# Patient Record
Sex: FEMALE | Race: ASIAN | NOT HISPANIC OR LATINO
[De-identification: names, ages, dates, MRNs, and addresses within clinical notes are randomized per-mention and may not be internally consistent; named-entity substitution may affect disease eponyms.]

---

## 2023-05-23 PROBLEM — Z00.00 ENCOUNTER FOR PREVENTIVE HEALTH EXAMINATION: Status: ACTIVE | Noted: 2023-05-23

## 2023-05-31 ENCOUNTER — NON-APPOINTMENT (OUTPATIENT)
Age: 63
End: 2023-05-31

## 2023-05-31 ENCOUNTER — APPOINTMENT (OUTPATIENT)
Dept: ORTHOPEDIC SURGERY | Facility: CLINIC | Age: 63
End: 2023-05-31
Payer: MEDICAID

## 2023-05-31 VITALS
SYSTOLIC BLOOD PRESSURE: 140 MMHG | WEIGHT: 120 LBS | DIASTOLIC BLOOD PRESSURE: 80 MMHG | HEIGHT: 60 IN | HEART RATE: 76 BPM | BODY MASS INDEX: 23.56 KG/M2

## 2023-05-31 DIAGNOSIS — M79.642 PAIN IN RIGHT HAND: ICD-10-CM

## 2023-05-31 DIAGNOSIS — M79.641 PAIN IN RIGHT HAND: ICD-10-CM

## 2023-05-31 PROCEDURE — 20550 NJX 1 TENDON SHEATH/LIGAMENT: CPT | Mod: F1

## 2023-05-31 PROCEDURE — 73130 X-RAY EXAM OF HAND: CPT | Mod: RT

## 2023-05-31 PROCEDURE — 99204 OFFICE O/P NEW MOD 45 MIN: CPT | Mod: 25

## 2023-05-31 NOTE — DISCUSSION/SUMMARY
[FreeTextEntry1] : She has findings consistent with bilateral hand pain secondary to flexor tendinitis most notably secondary to a left index finger trigger finger and a left notable right middle and ring finger trigger finger/flexor tendinitis.\par \par I had a discussion with the patient regarding today's visit, the prognosis of this diagnosis, and treatment recommendations and options. With regard to the left index finger, she and her daughter opted to proceed with a cortisone injection at the flexor tendon sheath. As a diabetic, she understands that this will likely elevate her blood sugars in the short-term.\par \par With regard to the right middle and ring fingers, given her symptoms are relatively mild, I recommended observation. Furthermore, as she is a diabetic, I explained to her and her daughter, that I prefer to only give one cortisone injection at a time, due to the effects of the cortisone on blood glucose levels. I did tell them, if she responds well to the injection given at the left index finger, she may return for further treatment at the right middle and ring fingers. She will follow up as needed, according to her symptoms in this regard. \par \par She has agreed to the above plan of management and has expressed full understanding.  All questions were fully answered to their satisfaction. \par \par My cumulative time spent on this visit included: Preparation for the visit, review of the medical records, review of pertinent diagnostic studies, examination and counseling of the patient on the above diagnosis, treatment plan and prognosis, orders of diagnostic tests, medication and/or appropriate procedures and documentation in the medical records of today's visit.

## 2023-05-31 NOTE — PHYSICAL EXAM
[de-identified] : - Constitutional: This is a female in no obvious distress.  She is accompanied by her daughter today. \par - Psych: Patient is alert and oriented to person, place and time.  Patient has a normal mood and affect.\par - Cardiovascular: Normal pulses throughout the upper extremities.  No significant varicosities are noted in the upper extremities. \par - Neuro: Strength and sensation are intact throughout the upper extremities.  Patient has normal coordination.\par - Respiratory:  Patient exhibits no evidence of shortness of breath or difficulty breathing.\par - Skin: No rashes, lesions, or other abnormalities are noted in the upper extremities.\par \par --- \par \par Examination of her left hand demonstrates swelling and tenderness along the A1 pulley of the index finger.  There is limited flexion.  There is no obvious triggering as she cannot flex to the point of triggering.  There is no triggering the other digits.  She is neurovascularly intact distally.\par \par Examination of her right hand demonstrates tenderness along the A1 pulleys of the middle and ring fingers.  There is no obvious swelling.  There is no obvious triggering.  She has some limitation terminal flexion.  She is neurovascularly intact distally. [de-identified] : AP, lateral, and oblique radiographs of the bilateral hands demonstrate mild degenerative changes at the PIP and DIP joints of the digits.

## 2023-05-31 NOTE — END OF VISIT
[FreeTextEntry3] : This note was written by Nohemi Slaughter on 05/31/2023 acting solely as a scribe for Dr. Giuseppe Monroe.\par  \par All medical record entries made by the Scribe were at my, Dr. Giuseppe Monroe, direction and personally dictated by me on 05/31/2023. I have personally reviewed the chart and agree that the record accurately reflects my personal performance of the history, physical exam, assessment and plan.

## 2023-05-31 NOTE — CONSULT LETTER
[Dear  ___] : Dear  [unfilled], [Consult Letter:] : I had the pleasure of evaluating your patient, [unfilled]. [Please see my note below.] : Please see my note below. [Consult Closing:] : Thank you very much for allowing me to participate in the care of this patient.  If you have any questions, please do not hesitate to contact me. [Sincerely,] : Sincerely, [FreeTextEntry3] : Giuseppe Monroe M.D.\par Surgery of the Hand and Upper Extremity\par Orthopaedic Surgery\par Chief, Hand Service, [facility]\par Director, Hand Service, [facility]\par  Of Orthopaedic Surgery, [facility] [employer] at [facility]\par [facility] Email: [email or web address] Office Phone: [phone]

## 2023-05-31 NOTE — PROCEDURE
[FreeTextEntry1] : -  After a discussion of risks and benefits, the patient agreed to proceed with a cortisone injection.  \par -  Side: Left\par -  Finger: Index finger trigger finger\par -  Medications: 0.5 cc of 1% Lidocaine and 1 cc of Celestone Soluspan, 6mg/cc, using sterile technique.\par -  Patient tolerated the procedure well, without complications.\par -  Patient was told that the symptoms may worsen for a day or two, and should then begin to improve. \par -  Instructions: Patient was instructed on activity modification for the next several days.\par -  Follow-up: Within 4 weeks to assess response to the injection.

## 2023-05-31 NOTE — ADDENDUM
[FreeTextEntry1] : I, Nohemi Slaughter, acted solely as a scribe for Dr. Monroe on this date on 05/31/2023.

## 2023-05-31 NOTE — HISTORY OF PRESENT ILLNESS
[Right] : right hand dominant [FreeTextEntry1] : She comes in today for evaluation of bilateral hand pain x 3 months, L>R. She complains of associated swelling palmarly to the hands as well as limitations of motion of the digits. Her symptoms are worse with use of her hands. She has pain over the A1 pulley of the left index finger as well as right middle and ring fingers. She rates her pain as a 7 to 8 out of 10 at this time. \par \par She is a type II diabetic. She takes Janumet. \par \par She is accompanied by her daughter, who aided in translation to English. \par \par She was referred by her PCP, Dr. Gigi Jain.

## 2023-06-03 ENCOUNTER — NON-APPOINTMENT (OUTPATIENT)
Age: 63
End: 2023-06-03

## 2023-06-14 ENCOUNTER — APPOINTMENT (OUTPATIENT)
Dept: ORTHOPEDIC SURGERY | Facility: CLINIC | Age: 63
End: 2023-06-14

## 2023-06-20 ENCOUNTER — NON-APPOINTMENT (OUTPATIENT)
Age: 63
End: 2023-06-20

## 2023-06-28 ENCOUNTER — APPOINTMENT (OUTPATIENT)
Dept: ORTHOPEDIC SURGERY | Facility: CLINIC | Age: 63
End: 2023-06-28
Payer: MEDICAID

## 2023-06-28 PROCEDURE — 99214 OFFICE O/P EST MOD 30 MIN: CPT | Mod: 25

## 2023-06-28 PROCEDURE — 20550 NJX 1 TENDON SHEATH/LIGAMENT: CPT | Mod: RT

## 2023-06-28 NOTE — PROCEDURE
Occupational Therapy  02/21/22    Orders received, chart reviewed and patient evaluated by occupational therapy. Pending progression with skilled acute occupational therapy, recommend:  Therapy up to 5 days/week in SNF setting vs LTC    Recommend with nursing ADLs with assist, modified bed in chair position 3x/day and toileting via bedpan with 2 assist. Thank you for completing as able in order to maintain patient strength, endurance and independence. Full evaluation to follow.      Thank you,   LEVI Peralta, OTR/L [FreeTextEntry1] : -  After a discussion of risks and benefits, the patient agreed to proceed with a cortisone injection.  \par -  Side: Right\par -  Finger: Ring finger trigger finger\par -  Medications: 0.5 cc of 1% Lidocaine and 1 cc of Kenalog, 40mg/cc, using sterile technique.\par -  Patient tolerated the procedure well, without complications.\par -  Patient was told that the symptoms may worsen for a day or two, and should then begin to improve. \par -  Instructions: Patient was instructed on activity modification for the next several days.\par -  Follow-up: Within 4 weeks to assess response to the injection.

## 2023-06-28 NOTE — DISCUSSION/SUMMARY
[FreeTextEntry1] : I had a discussion regarding today's visit, the diagnosis and treatment recommendations and options.  We also discussed changes since the last visit.  With regard to the left index finger, given her improvement post cortisone injection at the flexor tendon sheath, four weeks ago, I recommended observation. She will follow up as needed in this regard.\par \par With regard to the right ring finger, given her worsening symptoms, she agreed to proceed with a cortisone injection at the flexor tendon sheath. As a diabetic, she understands that this will likely elevate her blood sugars in the short-term.\par \par The patient has agreed to the above plan of management and has expressed full understanding.  All questions were fully answered to the patient's satisfaction.\par \par My cumulative time spent on today's visit was greater than 30 minutes and included: Preparation for the visit, review of the medical records, review of pertinent diagnostic studies, examination and counseling of the patient on the above diagnosis, treatment plan and prognosis, orders of diagnostic tests, medications and/or appropriate procedures and documentation in the medical records of today's visit.

## 2023-06-28 NOTE — PHYSICAL EXAM
[de-identified] : - Constitutional: This is a female in no obvious distress.  She is accompanied by her daughter today. \par - Psych: Patient is alert and oriented to person, place and time.  Patient has a normal mood and affect.\par - Cardiovascular: Normal pulses throughout the upper extremities.  No significant varicosities are noted in the upper extremities. \par - Neuro: Strength and sensation are intact throughout the upper extremities.  Patient has normal coordination.\par - Respiratory:  Patient exhibits no evidence of shortness of breath or difficulty breathing.\par - Skin: No rashes, lesions, or other abnormalities are noted in the upper extremities.\par \par --- \par \par Examination of her left hand demonstrates no further swelling or tenderness along the A1 pulley of the index finger.  There is improved flexion.  There is no triggering the other digits.  She is neurovascularly intact distally.\par \par Examination of her right hand demonstrates tenderness along and swelling along the A1 pulley of the ring finger.  There is no triggering, as she cannot flex to the point of triggering.  There is mild tenderness without swelling along the A1 pulley of the middle finger.  There is no obvious triggering of the middle finger.  She has some limitation terminal flexion.  She is neurovascularly intact distally. [de-identified] : AP, lateral, and oblique radiographs of the bilateral hands dated 5/31/2023 demonstrated mild degenerative changes at the PIP and DIP joints of the digits.

## 2023-06-28 NOTE — END OF VISIT
[FreeTextEntry3] : This note was written by Nohemi Slaughter on 06/28/2023 acting solely as a scribe for Dr. Giuseppe Monroe.\par  \par All medical record entries made by the Scribe were at my, Dr. Giuseppe Monroe, direction and personally dictated by me on 06/28/2023. I have personally reviewed the chart and agree that the record accurately reflects my personal performance of the history, physical exam, assessment and plan.

## 2023-06-28 NOTE — ADDENDUM
[FreeTextEntry1] : I, Nohemi Slaughter, acted solely as a scribe for Dr. Monroe on this date on 06/28/2023.

## 2023-06-28 NOTE — HISTORY OF PRESENT ILLNESS
[FreeTextEntry1] : Follow-up regarding bilateral hand pain secondary to flexor tendinitis most notably secondary to a left index finger trigger finger and a left notable right middle and ring finger trigger finger/flexor tendinitis.\par \par See note from when she was in the office 4 weeks ago.  She was given a cortisone injection at the left index finger trigger finger.\par \par She is doing well with regard to the left index finger post cortisone injection 4 weeks ago. She now has complaints of right ring finger pain and triggering. \par \par She is a type II diabetic. She takes Janumet. \par \par She is accompanied by her daughter, who aided in translation to English.

## 2024-06-05 ENCOUNTER — NON-APPOINTMENT (OUTPATIENT)
Age: 64
End: 2024-06-05

## 2024-06-13 ENCOUNTER — APPOINTMENT (OUTPATIENT)
Dept: ORTHOPEDIC SURGERY | Facility: CLINIC | Age: 64
End: 2024-06-13
Payer: MEDICAID

## 2024-06-13 PROBLEM — M65.322 TRIGGER INDEX FINGER OF LEFT HAND: Status: ACTIVE | Noted: 2023-05-31

## 2024-06-13 PROBLEM — M77.8 TENDINITIS OF RIGHT HAND: Status: ACTIVE | Noted: 2023-05-31

## 2024-06-13 PROBLEM — M77.8 TENDINITIS OF LEFT HAND: Status: ACTIVE | Noted: 2023-05-31

## 2024-06-13 PROBLEM — M65.331 TRIGGER MIDDLE FINGER OF RIGHT HAND: Status: ACTIVE | Noted: 2023-05-31

## 2024-06-13 PROBLEM — M65.341 TRIGGER RING FINGER OF RIGHT HAND: Status: ACTIVE | Noted: 2023-05-31

## 2024-06-20 ENCOUNTER — APPOINTMENT (OUTPATIENT)
Dept: ORTHOPEDIC SURGERY | Facility: CLINIC | Age: 64
End: 2024-06-20
Payer: MEDICAID

## 2024-06-20 DIAGNOSIS — M77.8 OTHER ENTHESOPATHIES, NOT ELSEWHERE CLASSIFIED: ICD-10-CM

## 2024-06-20 DIAGNOSIS — M65.341 TRIGGER FINGER, RIGHT RING FINGER: ICD-10-CM

## 2024-06-20 DIAGNOSIS — M65.322 TRIGGER FINGER, LEFT INDEX FINGER: ICD-10-CM

## 2024-06-20 DIAGNOSIS — M65.331 TRIGGER FINGER, RIGHT MIDDLE FINGER: ICD-10-CM

## 2024-06-20 PROCEDURE — 99213 OFFICE O/P EST LOW 20 MIN: CPT | Mod: 25

## 2024-06-20 PROCEDURE — 20550 NJX 1 TENDON SHEATH/LIGAMENT: CPT | Mod: F1,F8

## 2024-06-20 RX ADMIN — BETAMETHASONE ACETATE AND BETAMETHASONE SODIUM PHOSPHATE MG/ML: 3; 3 INJECTION, SUSPENSION INTRA-ARTICULAR; INTRALESIONAL; INTRAMUSCULAR; SOFT TISSUE at 00:00

## 2024-06-20 RX ADMIN — LIDOCAINE HYDROCHLORIDE %: 10 INJECTION, SOLUTION INFILTRATION; PERINEURAL at 00:00

## 2024-06-20 NOTE — HISTORY OF PRESENT ILLNESS
[FreeTextEntry1] : Less than 1 year status post right ring finger trigger cortisone injection #1.  She was given a cortisone injection at her left index finger less than 13 months ago.  She also has had flexor tendinitis of other digits.  She returns today complaining of returned locking, clicking, and pain at her previously injected right ring and left index fingers. She reports that she had symptom relief from the previous injections at those fingers, but her symptoms returned about one month ago.  She is a type II diabetic. She takes Janumet.   She is accompanied by her daughter, who aided in translation to English.

## 2024-06-20 NOTE — DISCUSSION/SUMMARY
[FreeTextEntry1] : I had a discussion regarding today's visit, the diagnosis and treatment recommendations and options.  We also discussed changes since the last visit.  At this time, she agreed to proceed with repeat cortisone injections at her previously injected right ring and left index trigger fingers. She was informed that, as a diabetic, she may notice a spike in her blood sugar following the injections. She opted to proceed. She will follow up according to symptoms.  The patient has agreed to the above plan of management and has expressed full understanding.  All questions were fully answered to the patient's satisfaction.  My cumulative time spent on today's visit was greater than 30 minutes and included: Preparation for the visit, review of the medical records, review of pertinent diagnostic studies, examination and counseling of the patient on the above diagnosis, treatment plan and prognosis, orders of diagnostic tests, medications and/or appropriate procedures and documentation in the medical records of today's visit.

## 2024-06-20 NOTE — PROCEDURE
[FreeTextEntry1] : -  After a discussion of risks and benefits, the patient agreed to proceed with a cortisone injection. -  Side/ Finger: Right ring trigger finger and left index trigger finger -  Medications: 0.5 cc of 1% Lidocaine and 1 cc of Celestone Soluspan, 6mg/cc, using sterile technique. -  Patient tolerated the procedure well, without complications. -  Patient was told that the symptoms may worsen for a day or two, and should then begin to improve. -  Instructions: Patient was instructed on activity modification for the next several days. -  Follow-up: Patient will follow up according to symptoms.

## 2024-06-20 NOTE — END OF VISIT
[FreeTextEntry3] : This note was written by Obi Solano on 6/20/24 acting solely as a scribe for Dr. Giuseppe Monroe.   All medical record entries made by the Scribe were at my, Dr. Giuseppe Monroe, direction and personally dictated by me on 6/20/24. I have personally reviewed the chart and agree that the record accurately reflects my personal performance of the history, physical exam, assessment and plan.

## 2024-06-20 NOTE — PROCEDURE
If your condition worsens or fails to improve we recommend that you receive another evaluation at the ER immediately or contact your PCP to discuss your concerns or return here. You must understand that you've received an urgent care treatment only and that you may be released before all your medical problems are known or treated. You the patient will arrange for followup care as instructed.   Follow up with your primary physician as needed  Tylenol or ibuprofen for pain or fever as needed  If you do take the  Narcotic pain meds be sure to take precautions as it can make you sleepy or drowsy  If you had xrays done we will call you with radiologist report  If you had labs done we will call you with the results  Your wound is not infected at this time. However if it gets painful, turns red or drainage you need to get reevaluated. Either return here or see your PCP or go to the ER  Clean the wound twice a day with betadiene and use antibiotic ointment. Use the one I prescribed if I prescribed one.                                                        Abdominal Pain   If your condition worsens or fails to improve we recommend that you receive another evaluation at the ER immediately or contact your PCP to discuss your concerns or return here. You must understand that you've received an urgent care treatment only and that you may be released before all your medical problems are known or treated. You the patient will arrange for followup care as instructed.   Watch for any increase pain, fever, localized pain to right lower abdomen or continued vomiting or diarrhea.   If you have diarrhea you can use immodium.       Nonspecific Vomiting and Diarrhea (Adult)  Vomiting and diarrhea can have many causes, including:  · Helping your body get rid of harmful substances   · Gastroenteritis caused by viruses, parasites, bacteria, or toxins.  · Allergy to or side effect of a food or medicine  · Severe stress or worry  (anxiety)   · Other illnesses  · Pregnancy  It is often hard to pinpoint an exact cause, even with testing. Vomiting and diarrhea often go away within a day or two without problems. If they continue, though, they can lead to too much loss of fluid (dehydration). This can be serious if not treated.    Home care  Medications  · You may use acetaminophen or NSAID medicines like ibuprofen or naproxen to control fever, unless another medicine was prescribed. If you have chronic liver or kidney disease, talk with your healthcare provider before using these medicines. Also talk with your provider if you've had a stomach ulcer or gastrointestinal bleeding. Don't give aspirin to anyone under 18 years of age who is ill with a fever. Don't use NSAID medicines if you are already taking one for another condition (like arthritis) or are on aspirin (such as for heart disease or after a stroke)  · If medicines for diarrhea or vomiting were prescribed, take these only as directed. Never take these without a healthcare providers approval.  General care  · If symptoms are severe, rest at home for the next 24 hours, or until you are feeling better.  · Washing your hands with soap and water, or using alcohol-based hand  is the best way to stop the spread of infection. Wash your hands after touching anyone who is sick.  · Wash your hands after using the toilet and before meals. Clean the toilet after each use.  · Caffeine, tobacco, and alcohol can make the diarrhea, cramping, and pain worse. Remember, caffeine not only is in coffee, but also is in chocolate, some energy drinks, and teas.  Diet  · Water and clear liquids are important so you don't get dehydrated. Drink a small amount at a time. Don't guzzle down the drinks. That may increase your nausea, make cramping worse, and cause the drinks to come back up.  · Sports drinks may also help. Make sure they are not too sugary, because this can sometimes make things worse. Also,  don't drink beverages that are too acidic, like orange juice and grape juice.  · If you are very dehydrated, sports drinks aren't a good choice. They have too much sugar and not enough electrolytes. In this case, commercially available products called oral rehydration solutions are best.  Food  · Don't force yourself to eat, especially if you have cramps, diarrhea, or vomiting. Eat just a little at a time, and then wait a few minutes before you try to eat more.  · Don't eat fatty, greasy, spicy, or fried foods.  · Don't eat dairy products if you have diarrhea. They can make it worse.  During the first 24 hours (the first full day), follow the diet below:  · Beverages: Sports drinks, soft drinks without caffeine, mineral water, and decaffeinated tea and coffee  · Soups: Clear broth, consommé, and bouillon  · Desserts: Plain gelatin, popsicles, and fruit juice bars  During the next 24 hours (the second day), you may add the following to the above if you are better. If not, continue what you did the first day:  · Hot cereal, plain toast, bread, rolls, crackers  · Plain noodles, rice, mashed potatoes, chicken noodle or rice soup  · Unsweetened canned fruit (avoid pineapple), bananas  · Limit fat intake to less than 15 grams per day by avoiding margarine, butter, oils, mayonnaise, sauces, gravies, fried foods, peanut butter, meat, poultry, and fish.  · Limit fiber. Avoid raw or cooked vegetables, fresh fruits (except bananas) and bran cereals.  · Limit caffeine and chocolate. No spices or seasonings except salt.  During the next 24 hours:  · Gradually resume a normal diet, as you feel better and your symptoms improve.  · If at any time your symptoms start getting worse again, go back to clear liquids until you feel better.  Food preparation  · If you have diarrhea, you should not prepare food for others. When preparing foods, wash your hands before and after.  · Wash your hands or use alcohol-based  after using  cutting boards, countertops, and knives that have been in contact with raw food.  · Keep uncooked meats away from cooked and ready-to-eat foods.  Follow-up care  Follow up with your healthcare advisor, or as advised. Call if you do not get better in the next 2 to 3 days. If a stool (diarrhea) sample was taken, or cultures done, you will be told if they are positive, or if your treatment needs to be changed. You may call as directed for the results.  If X-rays were taken, and a radiologist has not yet looked at them, he or she will do so. You will be told if there is a change in the reading, especially if it affects your treatment.  Call 911  Call 911 if any of these occur:  · Trouble breathing  · Chest pain  · Confusion  · Severe drowsiness or trouble awakening  · Fainting or loss of consciousness  · Rapid heart rate  · Seizure  · Stiff neck  · Severe weakness, dizziness, or lightheadedness  When to seek medical advice  Call your healthcare provider right away if any of these occur:  · Bloody or black vomit or stools.  · Severe, steady abdominal pain or any abdominal pain that is getting worse.  · Severe headache or stiff neck  · An inability to hold down even sips of liquids for more than 12 hours.  · Vomiting that lasts more than 24 hours.  · Diarrhea that lasts more than 24 hours.  · Fever of 100.4°F (38.0°C) or higher that lasts more than 48 hours, or as directed by your health care provider  · Yellowish color to your skin or the whites of your eyes.  · Signs of dehydration, such as dry mouth, little urine (less than every 6 hours), or very dark urine.  Date Last Reviewed: 1/3/2016  © 6485-1788 [employer]. [address] [place] [address], [place] [address]. All rights reserved. This information is not intended as a substitute for professional medical care. Always follow your healthcare professional's instructions.        Athletes Foot    Athletes foot (tinea pedis) is caused by a fungal infection in the  skin. It affects the skin between the toes, causing cracks in the skin called fissures. It can also affect the bottom of the foot where it causes dry white scales and peeling of the skin. This infection is more likely to occur when the foot is in hot, sweaty socks and shoes for long periods of time. You may feel itching and burning between your toes. This infection is treated with skin creams or medicine taken by mouth.  Home care  The following are general care guidelines:  · It is important to keep the feet dry. Use absorbent cotton socks and change them if they become sweaty. Or wear an open-toe shoe or sandal. Wash the feet at least once a day with soap and water.  · Apply the antifungal cream as prescribed. Some antifungal creams are available without a prescription.  · It may take a week before the rash starts to improve. It can take about 3 to 4 weeks to completely clear. Continue the medicine until the rash is all gone.  · Use over-the-counter antifungal powders or sprays on your feet after exposure to high-risk environments, such as public showers, gyms, and locker rooms. This can help prevent future infections. Wearing appropriate shoes in these situations can help.  Prevention  The following tips may help prevent athletes foot:  · Don't share shoes or socks with someone who has athlete's foot.  · Don't walk barefoot in places where a fungal infection can spread quickly such as locker rooms, showers, and swimming pools.  · Change socks regularly.  · Alternate shoes to assist in drying.  Follow-up care  Follow up with your healthcare provider as recommended if the rash does not improve after 10 days of treatment, or if the rash continues to spread.  When to seek medical care  Get medical attention right away if any of the following occur:  · Fever of 100.4°F (38°C) or higher, or as directed  · Increasing redness or swelling of the foot  · Infection comes back soon after treatment  · Pus draining from  cracks in the skin  Date Last Reviewed: 8/1/2016  © 0670-3032 [employer]. [address] [place] [address], [place] [address]. All rights reserved. This information is not intended as a substitute for professional medical care. Always follow your healthcare professional's instructions.         [FreeTextEntry1] : -  After a discussion of risks and benefits, the patient agreed to proceed with a cortisone injection. -  Side/ Finger: Right ring trigger finger and left index trigger finger -  Medications: 0.5 cc of 1% Lidocaine and 1 cc of Celestone Soluspan, 6mg/cc, using sterile technique. -  Patient tolerated the procedure well, without complications. -  Patient was told that the symptoms may worsen for a day or two, and should then begin to improve. -  Instructions: Patient was instructed on activity modification for the next several days. -  Follow-up: Patient will follow up according to symptoms.

## 2024-06-20 NOTE — PHYSICAL EXAM
[de-identified] : - Constitutional: This is a female in no obvious distress.  She is accompanied by her daughter today.  - Psych: Patient is alert and oriented to person, place and time.  Patient has a normal mood and affect. - Cardiovascular: Normal pulses throughout the upper extremities.  No significant varicosities are noted in the upper extremities.  - Neuro: Strength and sensation are intact throughout the upper extremities.  Patient has normal coordination. - Respiratory:  Patient exhibits no evidence of shortness of breath or difficulty breathing. - Skin: No rashes, lesions, or other abnormalities are noted in the upper extremities.  ---   Examination of her left hand demonstrates swelling and tenderness along the A1 pulley of the index finger.  There is limitation of terminal flexion with triggering.  There is no triggering the other digits.  She is neurovascularly intact distally.  Examination of her right hand demonstrates tenderness along and swelling along the A1 pulley of the ring finger.  There is no triggering, as she cannot flex to the point of triggering.  There is no obvious triggering of the other digits.  She has some limitation terminal flexion.  She is neurovascularly intact distally. [de-identified] : AP, lateral, and oblique radiographs of the bilateral hands dated 5/31/2023 demonstrated mild degenerative changes at the PIP and DIP joints of the digits.

## 2024-06-20 NOTE — PHYSICAL EXAM
[de-identified] : - Constitutional: This is a female in no obvious distress.  She is accompanied by her daughter today.  - Psych: Patient is alert and oriented to person, place and time.  Patient has a normal mood and affect. - Cardiovascular: Normal pulses throughout the upper extremities.  No significant varicosities are noted in the upper extremities.  - Neuro: Strength and sensation are intact throughout the upper extremities.  Patient has normal coordination. - Respiratory:  Patient exhibits no evidence of shortness of breath or difficulty breathing. - Skin: No rashes, lesions, or other abnormalities are noted in the upper extremities.  ---   Examination of her left hand demonstrates swelling and tenderness along the A1 pulley of the index finger.  There is limitation of terminal flexion with triggering.  There is no triggering the other digits.  She is neurovascularly intact distally.  Examination of her right hand demonstrates tenderness along and swelling along the A1 pulley of the ring finger.  There is no triggering, as she cannot flex to the point of triggering.  There is no obvious triggering of the other digits.  She has some limitation terminal flexion.  She is neurovascularly intact distally. [de-identified] : AP, lateral, and oblique radiographs of the bilateral hands dated 5/31/2023 demonstrated mild degenerative changes at the PIP and DIP joints of the digits.

## 2024-06-24 RX ORDER — LIDOCAINE HYDROCHLORIDE 10 MG/ML
1 INJECTION, SOLUTION INFILTRATION; PERINEURAL
Refills: 0 | Status: COMPLETED | OUTPATIENT
Start: 2024-06-20

## 2024-06-24 RX ORDER — BETAMETHA AC,SOD PHOS/WATER/PF 6 MG/ML
6 (3-3) VIAL (ML) INJECTION
Qty: 1 | Refills: 0 | Status: COMPLETED | OUTPATIENT
Start: 2024-06-20

## 2024-09-21 ENCOUNTER — NON-APPOINTMENT (OUTPATIENT)
Age: 64
End: 2024-09-21

## 2024-09-21 NOTE — PHYSICAL EXAM
[de-identified] : - Constitutional: This is a female in no obvious distress.  She is accompanied by her daughter today.  - Psych: Patient is alert and oriented to person, place and time.  Patient has a normal mood and affect. - Cardiovascular: Normal pulses throughout the upper extremities.  No significant varicosities are noted in the upper extremities.  - Neuro: Strength and sensation are intact throughout the upper extremities.  Patient has normal coordination. - Respiratory:  Patient exhibits no evidence of shortness of breath or difficulty breathing. - Skin: No rashes, lesions, or other abnormalities are noted in the upper extremities.  ---   Examination of her left hand demonstrates swelling and tenderness along the A1 pulley of the index finger.  There is limitation of terminal flexion with triggering.  There is no triggering the other digits.  She is neurovascularly intact distally.  Examination of her right hand demonstrates tenderness along and swelling along the A1 pulley of the ring finger.  There is no triggering, as she cannot flex to the point of triggering.  There is no obvious triggering of the other digits.  She has some limitation terminal flexion.  She is neurovascularly intact distally. [de-identified] : AP, lateral, and oblique radiographs of the bilateral hands dated 5/31/2023 demonstrated mild degenerative changes at the PIP and DIP joints of the digits.

## 2024-09-21 NOTE — HISTORY OF PRESENT ILLNESS
[FreeTextEntry1] : 3-1/2 months status post right ring finger trigger cortisone injection #2 and left index finger trigger cortisone injection #2.  She returns today  She is a type II diabetic. She takes Janumet.   She is accompanied by her daughter, who aided in translation to English.  comfortable appearance/improvement verbalized

## 2024-10-02 ENCOUNTER — APPOINTMENT (OUTPATIENT)
Dept: ORTHOPEDIC SURGERY | Facility: CLINIC | Age: 64
End: 2024-10-02
Payer: MEDICAID

## 2024-10-02 DIAGNOSIS — M77.8 OTHER ENTHESOPATHIES, NOT ELSEWHERE CLASSIFIED: ICD-10-CM

## 2024-10-02 DIAGNOSIS — M65.341 TRIGGER FINGER, RIGHT RING FINGER: ICD-10-CM

## 2024-10-02 DIAGNOSIS — M65.331 TRIGGER FINGER, RIGHT MIDDLE FINGER: ICD-10-CM

## 2024-10-02 DIAGNOSIS — M65.322 TRIGGER FINGER, LEFT INDEX FINGER: ICD-10-CM

## 2024-10-02 PROCEDURE — 20550 NJX 1 TENDON SHEATH/LIGAMENT: CPT | Mod: 50

## 2024-10-02 PROCEDURE — 99214 OFFICE O/P EST MOD 30 MIN: CPT | Mod: 25

## 2024-10-02 RX ORDER — LIDOCAINE HYDROCHLORIDE 10 MG/ML
1 INJECTION, SOLUTION INFILTRATION; PERINEURAL
Refills: 0 | Status: COMPLETED | OUTPATIENT
Start: 2024-10-02

## 2024-10-02 RX ORDER — BETAMETHA AC,SOD PHOS/WATER/PF 6 MG/ML
6 (3-3) VIAL (ML) INJECTION
Qty: 1 | Refills: 0 | Status: COMPLETED | OUTPATIENT
Start: 2024-10-02

## 2024-10-02 RX ADMIN — BETAMETHASONE ACETATE AND BETAMETHASONE SODIUM PHOSPHATE 1 MG/ML: 3; 3 INJECTION, SUSPENSION INTRA-ARTICULAR; INTRALESIONAL; INTRAMUSCULAR; SOFT TISSUE at 00:00

## 2024-10-02 RX ADMIN — LIDOCAINE HYDROCHLORIDE 0.5 %: 10 INJECTION, SOLUTION INFILTRATION; PERINEURAL at 00:00

## 2024-10-02 NOTE — END OF VISIT
[FreeTextEntry3] : This note was written by Krishan Edgar on 10/02/2024 acting solely as a scribe for Dr. Giuseppe Monroe.   All medical record entries made by the Scribe were at my, Dr. Giuseppe Monroe, direction and personally dictated by me on 10/02/2024. I have personally reviewed the chart and agree that the record accurately reflects my personal performance of the history, physical exam, assessment and plan.

## 2024-10-02 NOTE — PROCEDURE
[FreeTextEntry1] : -  After a discussion of risks and benefits, the patient agreed to proceed with a cortisone injection.  -  Side: Right & Left -  Finger: ring finger and index finger trigger finger -  Medications: 0.5 cc of 1% Lidocaine and 1 cc of Celestone Soluspan, 6mg/cc, using sterile technique. -  Patient tolerated the procedure well, without complications. -  Patient was told that the symptoms may worsen for a day or two, and should then begin to improve. -  Instructions: Patient was instructed on activity modification for the next several days. -  Follow-up: Within 6 weeks to assess response to the injection.

## 2024-10-02 NOTE — ADDENDUM
[FreeTextEntry1] :  I, Krishan Edgar, acted solely as a scribe for Dr. Monroe on this date on 10/02/2024.

## 2024-10-02 NOTE — DISCUSSION/SUMMARY
[FreeTextEntry1] : I had a discussion regarding today's visit, the diagnosis and treatment recommendations and options.  We also discussed changes since the last visit.  At this time, we discussed treatment options of either surgical release or repeat cortisone injections.  I did tell her and her daughter that it is unlikely that the cortisone injections will provide her with long-term relief.  Knowing this, she and her daughter opted to proceed with a repeat cortisone injection at her right ring finger trigger finger and at her left index finger trigger finger.  As this is her third injection, if her symptoms recur, then she cannot have any more injections of these fingers.  With regard to the right middle finger trigger finger, I would not recommend injecting this as well today, he as 3 injections at the same time would likely elevate her blood sugars significantly.  In addition, as a diabetic, she understands that this will likely elevate her blood sugars in the short-term.   With regard to the right middle finger, I recommended observation as her symptoms are mild. If her symptoms do not improve, I discussed the option of a cortisone injection in the future.   The patient has agreed to the above plan of management and has expressed full understanding.  All questions were fully answered to the patient's satisfaction.  My cumulative time spent on today's visit was greater than 30 minutes and included: Preparation for the visit, review of the medical records, review of pertinent diagnostic studies, examination and counseling of the patient on the above diagnosis, treatment plan and prognosis, orders of diagnostic tests, medications and/or appropriate procedures and documentation in the medical records of today's visit.

## 2024-10-02 NOTE — PHYSICAL EXAM
[de-identified] : - Constitutional: This is a female in no obvious distress.  She is accompanied by her daughter today.  - Psych: Patient is alert and oriented to person, place and time.  Patient has a normal mood and affect. - Cardiovascular: Normal pulses throughout the upper extremities.  No significant varicosities are noted in the upper extremities.  - Neuro: Strength and sensation are intact throughout the upper extremities.  Patient has normal coordination.  ---   Examination of her left hand demonstrates swelling and tenderness along the A1 pulley of the index finger.  There is limitation of terminal flexion with triggering.  There is no triggering the other digits.  She is neurovascularly intact distally.  Examination of her right hand demonstrates tenderness and swelling along the A1 pulley of the ring finger.  There is no triggering, as she cannot flex to the point of triggering.  There is milder tenderness without swelling along the A1 pulley of the middle finger with mild triggering.  There is no obvious triggering of the other digits.  She has some limitation terminal flexion.  She is neurovascularly intact distally. [de-identified] : AP, lateral, and oblique radiographs of the bilateral hands dated 5/31/2023 demonstrated mild degenerative changes at the PIP and DIP joints of the digits.

## 2024-10-02 NOTE — PHYSICAL EXAM
[de-identified] : - Constitutional: This is a female in no obvious distress.  She is accompanied by her daughter today.  - Psych: Patient is alert and oriented to person, place and time.  Patient has a normal mood and affect. - Cardiovascular: Normal pulses throughout the upper extremities.  No significant varicosities are noted in the upper extremities.  - Neuro: Strength and sensation are intact throughout the upper extremities.  Patient has normal coordination.  ---   Examination of her left hand demonstrates swelling and tenderness along the A1 pulley of the index finger.  There is limitation of terminal flexion with triggering.  There is no triggering the other digits.  She is neurovascularly intact distally.  Examination of her right hand demonstrates tenderness and swelling along the A1 pulley of the ring finger.  There is no triggering, as she cannot flex to the point of triggering.  There is milder tenderness without swelling along the A1 pulley of the middle finger with mild triggering.  There is no obvious triggering of the other digits.  She has some limitation terminal flexion.  She is neurovascularly intact distally. [de-identified] : AP, lateral, and oblique radiographs of the bilateral hands dated 5/31/2023 demonstrated mild degenerative changes at the PIP and DIP joints of the digits.

## 2024-10-02 NOTE — HISTORY OF PRESENT ILLNESS
[FreeTextEntry1] : 3-1/2 months status post right ring finger trigger cortisone injection #2 and left index finger trigger cortisone injection #2.  She returns today for right middle and ring finger triggering. She also has left index finger triggering. She rates her pain as a 9/10. She has locking, clicking and swelling She states that the last cortisone injection did not provide much relief to her symptoms.   She is a type II diabetic. She takes Janumet.   She is accompanied by her daughter, who aided in translation to English.